# Patient Record
Sex: MALE | Race: WHITE | Employment: FULL TIME | URBAN - METROPOLITAN AREA
[De-identification: names, ages, dates, MRNs, and addresses within clinical notes are randomized per-mention and may not be internally consistent; named-entity substitution may affect disease eponyms.]

---

## 2019-01-08 ENCOUNTER — APPOINTMENT (OUTPATIENT)
Dept: GENERAL RADIOLOGY | Age: 29
End: 2019-01-08
Payer: COMMERCIAL

## 2019-01-08 ENCOUNTER — HOSPITAL ENCOUNTER (EMERGENCY)
Age: 29
Discharge: HOME | End: 2019-01-08
Attending: EMERGENCY MEDICINE
Payer: COMMERCIAL

## 2019-01-08 VITALS
RESPIRATION RATE: 14 BRPM | WEIGHT: 170 LBS | DIASTOLIC BLOOD PRESSURE: 78 MMHG | OXYGEN SATURATION: 100 % | TEMPERATURE: 98.2 F | BODY MASS INDEX: 25.85 KG/M2 | HEART RATE: 77 BPM | SYSTOLIC BLOOD PRESSURE: 122 MMHG

## 2019-01-08 DIAGNOSIS — M94.0 COSTOCHONDRITIS: ICD-10-CM

## 2019-01-08 DIAGNOSIS — R07.89 CHEST WALL PAIN: Primary | ICD-10-CM

## 2019-01-08 LAB
A/G RATIO: 2 (ref 1.1–2.2)
ALBUMIN SERPL-MCNC: 5.2 G/DL (ref 3.4–5)
ALP BLD-CCNC: 46 U/L (ref 40–129)
ALT SERPL-CCNC: 23 U/L (ref 10–40)
ANION GAP SERPL CALCULATED.3IONS-SCNC: 13 MMOL/L (ref 3–16)
AST SERPL-CCNC: 16 U/L (ref 15–37)
BASOPHILS ABSOLUTE: 0.1 K/UL (ref 0–0.2)
BASOPHILS RELATIVE PERCENT: 1.1 %
BILIRUB SERPL-MCNC: 0.9 MG/DL (ref 0–1)
BUN BLDV-MCNC: 14 MG/DL (ref 7–20)
CALCIUM SERPL-MCNC: 10 MG/DL (ref 8.3–10.6)
CHLORIDE BLD-SCNC: 99 MMOL/L (ref 99–110)
CO2: 28 MMOL/L (ref 21–32)
CREAT SERPL-MCNC: 0.8 MG/DL (ref 0.9–1.3)
EOSINOPHILS ABSOLUTE: 0 K/UL (ref 0–0.6)
EOSINOPHILS RELATIVE PERCENT: 0.3 %
GFR AFRICAN AMERICAN: >60
GFR NON-AFRICAN AMERICAN: >60
GLOBULIN: 2.6 G/DL
GLUCOSE BLD-MCNC: 95 MG/DL (ref 70–99)
HCT VFR BLD CALC: 47.1 % (ref 40.5–52.5)
HEMOGLOBIN: 16.3 G/DL (ref 13.5–17.5)
LYMPHOCYTES ABSOLUTE: 1.6 K/UL (ref 1–5.1)
LYMPHOCYTES RELATIVE PERCENT: 33.2 %
MCH RBC QN AUTO: 32.3 PG (ref 26–34)
MCHC RBC AUTO-ENTMCNC: 34.6 G/DL (ref 31–36)
MCV RBC AUTO: 93.3 FL (ref 80–100)
MONOCYTES ABSOLUTE: 0.3 K/UL (ref 0–1.3)
MONOCYTES RELATIVE PERCENT: 5.8 %
NEUTROPHILS ABSOLUTE: 2.9 K/UL (ref 1.7–7.7)
NEUTROPHILS RELATIVE PERCENT: 59.6 %
PDW BLD-RTO: 12.5 % (ref 12.4–15.4)
PLATELET # BLD: 299 K/UL (ref 135–450)
PMV BLD AUTO: 7 FL (ref 5–10.5)
POTASSIUM SERPL-SCNC: 4.7 MMOL/L (ref 3.5–5.1)
RBC # BLD: 5.05 M/UL (ref 4.2–5.9)
SODIUM BLD-SCNC: 140 MMOL/L (ref 136–145)
TOTAL PROTEIN: 7.8 G/DL (ref 6.4–8.2)
TROPONIN: <0.01 NG/ML
WBC # BLD: 4.9 K/UL (ref 4–11)

## 2019-01-08 PROCEDURE — 71046 X-RAY EXAM CHEST 2 VIEWS: CPT

## 2019-01-08 PROCEDURE — 84484 ASSAY OF TROPONIN QUANT: CPT

## 2019-01-08 PROCEDURE — 85025 COMPLETE CBC W/AUTO DIFF WBC: CPT

## 2019-01-08 PROCEDURE — 99285 EMERGENCY DEPT VISIT HI MDM: CPT

## 2019-01-08 PROCEDURE — 80053 COMPREHEN METABOLIC PANEL: CPT

## 2019-01-08 PROCEDURE — 93005 ELECTROCARDIOGRAM TRACING: CPT | Performed by: EMERGENCY MEDICINE

## 2019-01-08 RX ORDER — NAPROXEN 500 MG/1
500 TABLET ORAL 2 TIMES DAILY
Qty: 20 TABLET | Refills: 0 | Status: SHIPPED | OUTPATIENT
Start: 2019-01-08 | End: 2019-01-18

## 2019-01-08 RX ORDER — METHOCARBAMOL 750 MG/1
750 TABLET, FILM COATED ORAL 3 TIMES DAILY
Qty: 15 TABLET | Refills: 0 | Status: SHIPPED | OUTPATIENT
Start: 2019-01-08 | End: 2019-01-13

## 2019-01-08 ASSESSMENT — PAIN SCALES - GENERAL: PAINLEVEL_OUTOF10: 0

## 2019-01-08 ASSESSMENT — PAIN DESCRIPTION - LOCATION: LOCATION: CHEST

## 2019-01-09 LAB
EKG ATRIAL RATE: 95 BPM
EKG DIAGNOSIS: NORMAL
EKG P AXIS: 58 DEGREES
EKG P-R INTERVAL: 148 MS
EKG Q-T INTERVAL: 326 MS
EKG QRS DURATION: 86 MS
EKG QTC CALCULATION (BAZETT): 409 MS
EKG R AXIS: 62 DEGREES
EKG T AXIS: 36 DEGREES
EKG VENTRICULAR RATE: 95 BPM

## 2019-01-09 PROCEDURE — 93010 ELECTROCARDIOGRAM REPORT: CPT | Performed by: INTERNAL MEDICINE

## 2024-04-22 ENCOUNTER — APPOINTMENT (OUTPATIENT)
Dept: CT IMAGING | Age: 34
End: 2024-04-22
Payer: COMMERCIAL

## 2024-04-22 ENCOUNTER — HOSPITAL ENCOUNTER (EMERGENCY)
Age: 34
Discharge: HOME | End: 2024-04-22
Payer: COMMERCIAL

## 2024-04-22 VITALS
TEMPERATURE: 98.7 F | BODY MASS INDEX: 26.68 KG/M2 | OXYGEN SATURATION: 97 % | RESPIRATION RATE: 16 BRPM | WEIGHT: 170 LBS | HEART RATE: 83 BPM | DIASTOLIC BLOOD PRESSURE: 54 MMHG | HEIGHT: 67 IN | SYSTOLIC BLOOD PRESSURE: 100 MMHG

## 2024-04-22 DIAGNOSIS — R91.1 PULMONARY NODULE: ICD-10-CM

## 2024-04-22 DIAGNOSIS — R10.9 ABDOMINAL PAIN, UNSPECIFIED ABDOMINAL LOCATION: Primary | ICD-10-CM

## 2024-04-22 DIAGNOSIS — K52.9 COLITIS: ICD-10-CM

## 2024-04-22 LAB
ALBUMIN SERPL-MCNC: 4.8 G/DL (ref 3.4–5)
ALBUMIN/GLOB SERPL: 1.7 {RATIO} (ref 1.1–2.2)
ALP SERPL-CCNC: 54 U/L (ref 40–129)
ALT SERPL-CCNC: 35 U/L (ref 10–40)
ANION GAP SERPL CALCULATED.3IONS-SCNC: 14 MMOL/L (ref 3–16)
AST SERPL-CCNC: 23 U/L (ref 15–37)
BASOPHILS # BLD: 0 K/UL (ref 0–0.2)
BASOPHILS NFR BLD: 0.6 %
BILIRUB SERPL-MCNC: 0.5 MG/DL (ref 0–1)
BILIRUB UR QL STRIP.AUTO: NEGATIVE
BUN SERPL-MCNC: 19 MG/DL (ref 7–20)
CALCIUM SERPL-MCNC: 9.2 MG/DL (ref 8.3–10.6)
CHLORIDE SERPL-SCNC: 101 MMOL/L (ref 99–110)
CLARITY UR: CLEAR
CO2 SERPL-SCNC: 22 MMOL/L (ref 21–32)
COLOR UR: YELLOW
CREAT SERPL-MCNC: 0.7 MG/DL (ref 0.9–1.3)
DEPRECATED RDW RBC AUTO: 13.8 % (ref 12.4–15.4)
EOSINOPHIL # BLD: 0 K/UL (ref 0–0.6)
EOSINOPHIL NFR BLD: 0.7 %
FLUAV RNA RESP QL NAA+PROBE: NOT DETECTED
FLUBV RNA RESP QL NAA+PROBE: NOT DETECTED
GFR SERPLBLD CREATININE-BSD FMLA CKD-EPI: >90 ML/MIN/{1.73_M2}
GLUCOSE SERPL-MCNC: 110 MG/DL (ref 70–99)
GLUCOSE UR STRIP.AUTO-MCNC: NEGATIVE MG/DL
HCT VFR BLD AUTO: 44.7 % (ref 40.5–52.5)
HGB BLD-MCNC: 15.1 G/DL (ref 13.5–17.5)
HGB UR QL STRIP.AUTO: ABNORMAL
KETONES UR STRIP.AUTO-MCNC: NEGATIVE MG/DL
LACTATE BLDV-SCNC: 0.8 MMOL/L (ref 0.4–2)
LEUKOCYTE ESTERASE UR QL STRIP.AUTO: NEGATIVE
LIPASE SERPL-CCNC: 61 U/L (ref 13–60)
LYMPHOCYTES # BLD: 2.7 K/UL (ref 1–5.1)
LYMPHOCYTES NFR BLD: 47.6 %
MCH RBC QN AUTO: 30.7 PG (ref 26–34)
MCHC RBC AUTO-ENTMCNC: 33.7 G/DL (ref 31–36)
MCV RBC AUTO: 90.9 FL (ref 80–100)
MONOCYTES # BLD: 0.4 K/UL (ref 0–1.3)
MONOCYTES NFR BLD: 7.8 %
NEUTROPHILS # BLD: 2.5 K/UL (ref 1.7–7.7)
NEUTROPHILS NFR BLD: 43.3 %
NITRITE UR QL STRIP.AUTO: NEGATIVE
PH UR STRIP.AUTO: 6 [PH] (ref 5–8)
PLATELET # BLD AUTO: 264 K/UL (ref 135–450)
PMV BLD AUTO: 7.1 FL (ref 5–10.5)
POTASSIUM SERPL-SCNC: 3.7 MMOL/L (ref 3.5–5.1)
PROT SERPL-MCNC: 7.6 G/DL (ref 6.4–8.2)
PROT UR STRIP.AUTO-MCNC: NEGATIVE MG/DL
RBC # BLD AUTO: 4.91 M/UL (ref 4.2–5.9)
RBC #/AREA URNS HPF: NORMAL /HPF (ref 0–4)
SARS-COV-2 RNA RESP QL NAA+PROBE: NOT DETECTED
SODIUM SERPL-SCNC: 137 MMOL/L (ref 136–145)
SP GR UR STRIP.AUTO: 1.01 (ref 1–1.03)
UA COMPLETE W REFLEX CULTURE PNL UR: ABNORMAL
UA DIPSTICK W REFLEX MICRO PNL UR: YES
URN SPEC COLLECT METH UR: ABNORMAL
UROBILINOGEN UR STRIP-ACNC: 0.2 E.U./DL
WBC # BLD AUTO: 5.7 K/UL (ref 4–11)
WBC #/AREA URNS HPF: NORMAL /HPF (ref 0–5)

## 2024-04-22 PROCEDURE — 6360000002 HC RX W HCPCS: Performed by: PHYSICIAN ASSISTANT

## 2024-04-22 PROCEDURE — 6360000004 HC RX CONTRAST MEDICATION: Performed by: PHYSICIAN ASSISTANT

## 2024-04-22 PROCEDURE — 2580000003 HC RX 258: Performed by: PHYSICIAN ASSISTANT

## 2024-04-22 PROCEDURE — 96375 TX/PRO/DX INJ NEW DRUG ADDON: CPT

## 2024-04-22 PROCEDURE — 85025 COMPLETE CBC W/AUTO DIFF WBC: CPT

## 2024-04-22 PROCEDURE — 74177 CT ABD & PELVIS W/CONTRAST: CPT

## 2024-04-22 PROCEDURE — 81001 URINALYSIS AUTO W/SCOPE: CPT

## 2024-04-22 PROCEDURE — 83605 ASSAY OF LACTIC ACID: CPT

## 2024-04-22 PROCEDURE — 87636 SARSCOV2 & INF A&B AMP PRB: CPT

## 2024-04-22 PROCEDURE — 80053 COMPREHEN METABOLIC PANEL: CPT

## 2024-04-22 PROCEDURE — 83690 ASSAY OF LIPASE: CPT

## 2024-04-22 PROCEDURE — 99285 EMERGENCY DEPT VISIT HI MDM: CPT

## 2024-04-22 PROCEDURE — 96374 THER/PROPH/DIAG INJ IV PUSH: CPT

## 2024-04-22 RX ORDER — DICYCLOMINE HYDROCHLORIDE 10 MG/1
10 CAPSULE ORAL 3 TIMES DAILY PRN
Qty: 20 CAPSULE | Refills: 0 | Status: SHIPPED | OUTPATIENT
Start: 2024-04-22

## 2024-04-22 RX ORDER — 0.9 % SODIUM CHLORIDE 0.9 %
1000 INTRAVENOUS SOLUTION INTRAVENOUS ONCE
Status: COMPLETED | OUTPATIENT
Start: 2024-04-22 | End: 2024-04-22

## 2024-04-22 RX ORDER — ONDANSETRON 4 MG/1
4 TABLET, FILM COATED ORAL EVERY 8 HOURS PRN
Qty: 20 TABLET | Refills: 0 | Status: SHIPPED | OUTPATIENT
Start: 2024-04-22

## 2024-04-22 RX ORDER — KETOROLAC TROMETHAMINE 30 MG/ML
15 INJECTION, SOLUTION INTRAMUSCULAR; INTRAVENOUS ONCE
Status: COMPLETED | OUTPATIENT
Start: 2024-04-22 | End: 2024-04-22

## 2024-04-22 RX ORDER — ONDANSETRON 2 MG/ML
4 INJECTION INTRAMUSCULAR; INTRAVENOUS ONCE
Status: COMPLETED | OUTPATIENT
Start: 2024-04-22 | End: 2024-04-22

## 2024-04-22 RX ADMIN — IOPAMIDOL 75 ML: 755 INJECTION, SOLUTION INTRAVENOUS at 12:18

## 2024-04-22 RX ADMIN — ONDANSETRON 4 MG: 2 INJECTION INTRAMUSCULAR; INTRAVENOUS at 11:51

## 2024-04-22 RX ADMIN — SODIUM CHLORIDE 1000 ML: 9 INJECTION, SOLUTION INTRAVENOUS at 11:53

## 2024-04-22 RX ADMIN — KETOROLAC TROMETHAMINE 15 MG: 30 INJECTION, SOLUTION INTRAMUSCULAR at 11:52

## 2024-04-22 ASSESSMENT — LIFESTYLE VARIABLES
HOW MANY STANDARD DRINKS CONTAINING ALCOHOL DO YOU HAVE ON A TYPICAL DAY: 3 OR 4
HOW OFTEN DO YOU HAVE A DRINK CONTAINING ALCOHOL: 2-3 TIMES A WEEK

## 2024-04-22 ASSESSMENT — ENCOUNTER SYMPTOMS
SHORTNESS OF BREATH: 0
EYE DISCHARGE: 0
SORE THROAT: 0
VOMITING: 0
EYE REDNESS: 0
NAUSEA: 1
DIARRHEA: 1
RHINORRHEA: 0
SINUS PAIN: 0
ABDOMINAL PAIN: 1
CONSTIPATION: 0
CHEST TIGHTNESS: 0
SINUS PRESSURE: 0
COUGH: 0

## 2024-04-22 ASSESSMENT — PAIN DESCRIPTION - ORIENTATION
ORIENTATION: LEFT
ORIENTATION: LEFT;ANTERIOR
ORIENTATION: LEFT;ANTERIOR

## 2024-04-22 ASSESSMENT — PAIN DESCRIPTION - LOCATION
LOCATION: ABDOMEN

## 2024-04-22 ASSESSMENT — PAIN - FUNCTIONAL ASSESSMENT
PAIN_FUNCTIONAL_ASSESSMENT: 0-10
PAIN_FUNCTIONAL_ASSESSMENT: 0-10

## 2024-04-22 ASSESSMENT — PAIN DESCRIPTION - DESCRIPTORS
DESCRIPTORS: PRESSURE;TIGHTNESS
DESCRIPTORS: TIGHTNESS
DESCRIPTORS: TIGHTNESS

## 2024-04-22 ASSESSMENT — PAIN SCALES - GENERAL
PAINLEVEL_OUTOF10: 3
PAINLEVEL_OUTOF10: 4
PAINLEVEL_OUTOF10: 4

## 2024-04-22 ASSESSMENT — PAIN DESCRIPTION - PAIN TYPE
TYPE: ACUTE PAIN
TYPE: ACUTE PAIN

## 2024-04-22 NOTE — ED PROVIDER NOTES
0.4 - 2.0 mmol/L   Microscopic Urinalysis   Result Value Ref Range    WBC, UA 0-2 0 - 5 /HPF    RBC, UA 0-2 0 - 4 /HPF         I estimate there is LOW risk for ACUTE APPENDICITIS, BOWEL OBSTRUCTION, CHOLECYSTITIS, DIVERTICULITIS, INCARCERATED HERNIA, PANCREATITIS, or PERFORATED BOWEL or ULCER, thus I consider the discharge disposition reasonable. Also, there is no evidence or peritonitis, sepsis, or toxicity. Chaitanya Espinosa and I have discussed the diagnosis and risks, and we agree with discharging home to follow-up with their primary doctor. We also discussed returning to the Emergency Department immediately if new or worsening symptoms occur. We have discussed the symptoms which are most concerning (e.g., bloody stool, fever, changing or worsening pain, vomiting) that necessitate immediate return.     FINAL Impression    1. Abdominal pain, unspecified abdominal location    2. Colitis    3. Pulmonary nodule        Blood pressure (!) 100/54, pulse 83, temperature 98.7 °F (37.1 °C), temperature source Oral, resp. rate 16, height 1.702 m (5' 7\"), weight 77.1 kg (170 lb), SpO2 97 %.           I am the Primary Clinician of Record.    FINAL IMPRESSION    No diagnosis found.      DISPOSITION/PLAN     DISPOSITION        PATIENT REFERRED TO:  No follow-up provider specified.    DISCHARGE MEDICATIONS:  New Prescriptions    No medications on file       DISCONTINUED MEDICATIONS:  Discontinued Medications    No medications on file              (Please note that portions of this note were completed with a voice recognition program.  Efforts were made to edit the dictations but occasionally words are mis-transcribed.)    Diana Borges PA-C (electronically signed)            Diana Borges PA-C  04/23/24 [id number]

## 2024-04-22 NOTE — ED NOTES
PIV removed without complications. DC instructions and medications reviewed. All questions answered. Pt verbalizes understanding of DC plan and agrees. DC via personal vehicle in stable condition.

## 2024-04-22 NOTE — DISCHARGE INSTRUCTIONS
Lung Nodules    Your x-ray or CT scan shows a nodule (\"spot\") on your lung.  This will require follow-up for further evaluation.    Please call your Primary Care Physician (PCP) if you have already established one and you confirmed your PCP during your ER visit today. If you do not have a PCP, please call the [facility] scheduling number to schedule your Emergency Department follow up visit. Please mention that you are scheduling an \"ER follow up visit\" for a lung nodule.      Learning About Lung Nodules  What is a lung nodule?  A lung nodule is a growth in the lung. A single nodule surrounded by lung tissue is called a solitary pulmonary nodule.  A lung nodule might not cause any symptoms. Your doctor may have found one or more nodules on your lung when you were having a chest X-ray or CT scan. Or it may have been found during a lung cancer screening.  A lung nodule may be caused by an old infection or cancer. It might also be a noncancerous growth.  Lung nodules can cause a screening to give an abnormal result. Most nodules do not cause any harm. But without further tests, your doctor can't tell whether an abnormal finding is cancer, a harmless nodule, or something else.  What can you expect when you have a lung nodule?  Your doctor will look at several risk factors to see how likely it is that the nodule is cancer. He or she will look at:  Whether you smoke or have ever smoked.  Your age and your family's medical history.  Whether you have ever had lung cancer.  The size and shape of the nodule.  Whether the nodule has changed in size. Your doctor may look at past chest X-rays or CT scans, if available, and compare them. Or you may have a series of CT scans to see if the nodule grows over time.  What happens next depends on the risk of the nodule being cancer.  If you have no risk factors and the nodule is small, your doctor may advise doing nothing.  If the risk is small, your doctor may schedule